# Patient Record
Sex: FEMALE | HISPANIC OR LATINO | Employment: UNEMPLOYED | URBAN - METROPOLITAN AREA
[De-identification: names, ages, dates, MRNs, and addresses within clinical notes are randomized per-mention and may not be internally consistent; named-entity substitution may affect disease eponyms.]

---

## 2019-08-07 ENCOUNTER — HOSPITAL ENCOUNTER (EMERGENCY)
Facility: HOSPITAL | Age: 44
Discharge: HOME | End: 2019-08-08
Attending: EMERGENCY MEDICINE
Payer: MEDICAID

## 2019-08-07 DIAGNOSIS — O02.89 NON-VIABLE PREGNANCY: Primary | ICD-10-CM

## 2019-08-07 DIAGNOSIS — O20.9 BLEEDING IN EARLY PREGNANCY: ICD-10-CM

## 2019-08-07 LAB
ABO + RH BLD: NORMAL
ALBUMIN SERPL BCP-MCNC: 3.9 G/DL (ref 3.5–5.2)
ALP SERPL-CCNC: 64 U/L (ref 55–135)
ALT SERPL W/O P-5'-P-CCNC: 11 U/L (ref 10–44)
ANION GAP SERPL CALC-SCNC: 9 MMOL/L (ref 8–16)
AST SERPL-CCNC: 17 U/L (ref 10–40)
B-HCG UR QL: POSITIVE
BACTERIA #/AREA URNS AUTO: NORMAL /HPF
BASOPHILS # BLD AUTO: 0.04 K/UL (ref 0–0.2)
BASOPHILS NFR BLD: 0.8 % (ref 0–1.9)
BILIRUB SERPL-MCNC: 0.3 MG/DL (ref 0.1–1)
BILIRUB UR QL STRIP: NEGATIVE
BUN SERPL-MCNC: 9 MG/DL (ref 6–20)
CALCIUM SERPL-MCNC: 9.6 MG/DL (ref 8.7–10.5)
CHLORIDE SERPL-SCNC: 107 MMOL/L (ref 95–110)
CLARITY UR REFRACT.AUTO: ABNORMAL
CO2 SERPL-SCNC: 22 MMOL/L (ref 23–29)
COLOR UR AUTO: YELLOW
CREAT SERPL-MCNC: 0.6 MG/DL (ref 0.5–1.4)
CTP QC/QA: YES
DIFFERENTIAL METHOD: ABNORMAL
EOSINOPHIL # BLD AUTO: 0.1 K/UL (ref 0–0.5)
EOSINOPHIL NFR BLD: 1.4 % (ref 0–8)
ERYTHROCYTE [DISTWIDTH] IN BLOOD BY AUTOMATED COUNT: 19.6 % (ref 11.5–14.5)
EST. GFR  (AFRICAN AMERICAN): >60 ML/MIN/1.73 M^2
EST. GFR  (NON AFRICAN AMERICAN): >60 ML/MIN/1.73 M^2
GLUCOSE SERPL-MCNC: 95 MG/DL (ref 70–110)
GLUCOSE UR QL STRIP: NEGATIVE
HCG INTACT+B SERPL-ACNC: NORMAL MIU/ML
HCT VFR BLD AUTO: 32.6 % (ref 37–48.5)
HGB BLD-MCNC: 9.3 G/DL (ref 12–16)
HGB UR QL STRIP: ABNORMAL
IMM GRANULOCYTES # BLD AUTO: 0.01 K/UL (ref 0–0.04)
IMM GRANULOCYTES NFR BLD AUTO: 0.2 % (ref 0–0.5)
KETONES UR QL STRIP: NEGATIVE
LEUKOCYTE ESTERASE UR QL STRIP: NEGATIVE
LYMPHOCYTES # BLD AUTO: 2 K/UL (ref 1–4.8)
LYMPHOCYTES NFR BLD: 39.2 % (ref 18–48)
MCH RBC QN AUTO: 21.1 PG (ref 27–31)
MCHC RBC AUTO-ENTMCNC: 28.5 G/DL (ref 32–36)
MCV RBC AUTO: 74 FL (ref 82–98)
MICROSCOPIC COMMENT: NORMAL
MONOCYTES # BLD AUTO: 0.5 K/UL (ref 0.3–1)
MONOCYTES NFR BLD: 9.6 % (ref 4–15)
NEUTROPHILS # BLD AUTO: 2.5 K/UL (ref 1.8–7.7)
NEUTROPHILS NFR BLD: 48.8 % (ref 38–73)
NITRITE UR QL STRIP: NEGATIVE
NRBC BLD-RTO: 0 /100 WBC
PH UR STRIP: 7 [PH] (ref 5–8)
PLATELET # BLD AUTO: 240 K/UL (ref 150–350)
PMV BLD AUTO: ABNORMAL FL (ref 9.2–12.9)
POTASSIUM SERPL-SCNC: 3.3 MMOL/L (ref 3.5–5.1)
PROT SERPL-MCNC: 8.3 G/DL (ref 6–8.4)
PROT UR QL STRIP: NEGATIVE
RBC # BLD AUTO: 4.41 M/UL (ref 4–5.4)
RBC #/AREA URNS AUTO: 1 /HPF (ref 0–4)
SODIUM SERPL-SCNC: 138 MMOL/L (ref 136–145)
SP GR UR STRIP: 1.01 (ref 1–1.03)
SQUAMOUS #/AREA URNS AUTO: 2 /HPF
URN SPEC COLLECT METH UR: ABNORMAL
WBC # BLD AUTO: 5.08 K/UL (ref 3.9–12.7)
WBC #/AREA URNS AUTO: 3 /HPF (ref 0–5)

## 2019-08-07 PROCEDURE — 99285 EMERGENCY DEPT VISIT HI MDM: CPT | Mod: 25

## 2019-08-07 PROCEDURE — 80053 COMPREHEN METABOLIC PANEL: CPT

## 2019-08-07 PROCEDURE — 86901 BLOOD TYPING SEROLOGIC RH(D): CPT

## 2019-08-07 PROCEDURE — 96360 HYDRATION IV INFUSION INIT: CPT

## 2019-08-07 PROCEDURE — 81025 URINE PREGNANCY TEST: CPT | Performed by: EMERGENCY MEDICINE

## 2019-08-07 PROCEDURE — 99283 PR EMERGENCY DEPT VISIT,LEVEL III: ICD-10-PCS | Mod: ,,, | Performed by: PHYSICIAN ASSISTANT

## 2019-08-07 PROCEDURE — 99283 EMERGENCY DEPT VISIT LOW MDM: CPT | Mod: ,,, | Performed by: PHYSICIAN ASSISTANT

## 2019-08-07 PROCEDURE — 85025 COMPLETE CBC W/AUTO DIFF WBC: CPT

## 2019-08-07 PROCEDURE — 84702 CHORIONIC GONADOTROPIN TEST: CPT

## 2019-08-07 PROCEDURE — 81001 URINALYSIS AUTO W/SCOPE: CPT

## 2019-08-07 PROCEDURE — 63600175 PHARM REV CODE 636 W HCPCS: Performed by: EMERGENCY MEDICINE

## 2019-08-07 RX ORDER — SODIUM CHLORIDE 9 MG/ML
125 INJECTION, SOLUTION INTRAVENOUS CONTINUOUS
Status: DISCONTINUED | OUTPATIENT
Start: 2019-08-07 | End: 2019-08-07

## 2019-08-07 RX ADMIN — SODIUM CHLORIDE 1000 ML: 0.9 INJECTION, SOLUTION INTRAVENOUS at 09:08

## 2019-08-08 VITALS
TEMPERATURE: 99 F | WEIGHT: 131 LBS | OXYGEN SATURATION: 98 % | HEART RATE: 81 BPM | SYSTOLIC BLOOD PRESSURE: 120 MMHG | HEIGHT: 64 IN | DIASTOLIC BLOOD PRESSURE: 62 MMHG | RESPIRATION RATE: 18 BRPM | BODY MASS INDEX: 22.36 KG/M2

## 2019-08-08 NOTE — ED NOTES
Patient encouraged to increase P.O. consumption to fill bladder for ultrasound; pt provided with pitcher of water. Pt verbalized understanding.

## 2019-08-08 NOTE — ED TRIAGE NOTES
Pt reports being 8 weeks pregnant. States the obgyn told her yesterday that she was bleeding; did not tell her why. Pt was instructed not to have sex until US in 2 weeks. Pt reports increased bleeding today with small clots. Pt denies abdominal pain. Reports B flank/ low back pain starting yesterday. Pain 3/10. Denies pain with urination, fevers, chills. .

## 2019-08-08 NOTE — ED PROVIDER NOTES
Encounter Date: 8/7/2019    SCRIBE #1 NOTE: I, Stephanienara Hyde, am scribing for, and in the presence of,  Dr. Cage. I have scribed the entire note.       History     Chief Complaint   Patient presents with    Vaginal Bleeding     Approximately 10 weeks pregnant     44 y.o. Female presenting in the ED with complaints of vaginal bleeding since yesterday. Patient is approximately 10 weeks pregnant. Patient speaks Azeri and is having her daughter translate. Patient saw her OBGYN yesterday who noticed vaginal bleeding during the exam. The patient was told to avoid sex either until the bleeding stopped, or she was able to receive an ultrasound. She denies receiving an ultrasound at the OBGYN. Patient states the bleeding continued today, noting the appearance of small blood clots which scared her. Patient has been nauseous for days due to her pregnancy. Patient endorses diffuse low back pain, denies upper abdominal pain.     The history is provided by a relative and medical records. A  was used.     Review of patient's allergies indicates:  No Known Allergies  No past medical history on file.  No past surgical history on file.  No family history on file.  Social History     Tobacco Use    Smoking status: Not on file   Substance Use Topics    Alcohol use: Not on file    Drug use: Not on file     Review of Systems  General: No fever.  No chills.  Eyes: No visual changes.  Head: No headache.    Integument: No rashes or lesions.  Chest: No shortness of breath.  Cardiovascular: No chest pain.  Abdomen: No abdominal pain.  No nausea or vomiting.  Urinary: Vaginal bleeding. No abnormal urination.  Neurologic: No focal weakness.  No numbness.  Hematologic: No easy bruising.  Endocrine: No excessive thirst or urination.  Musculoskeletal: Low back pain.    Physical Exam     Initial Vitals [08/07/19 1951]   BP Pulse Resp Temp SpO2   (!) 127/58 81 16 98.6 °F (37 °C) 100 %      MAP       --         Physical  Exam    Nursing note and vitals reviewed.    Appearance: No acute distress.  Skin: No rashes seen.  Good turgor.  No abrasions.  No ecchymoses.  Eyes: No conjunctival injection.  ENT: Oropharynx clear.    Chest: Clear to auscultation bilaterally.  Good air movement.  No wheezes.  No rhonchi.  Cardiovascular: Regular rate and rhythm.  No murmurs. No gallops. No rubs.  Abdomen: Soft.  Not distended.  Nontender.  No guarding.  No rebound.  Musculoskeletal: Good range of motion all joints.  No deformities.  Neck supple.  No meningismus.  Neurologic: Motor intact.  Sensation intact.  Cerebellar intact.  Cranial nerves intact.  Mental Status:  Alert and oriented x 3.  Appropriate, conversant.    ED Course   Procedures  Labs Reviewed   CBC W/ AUTO DIFFERENTIAL - Abnormal; Notable for the following components:       Result Value    Hemoglobin 9.3 (*)     Hematocrit 32.6 (*)     Mean Corpuscular Volume 74 (*)     Mean Corpuscular Hemoglobin 21.1 (*)     Mean Corpuscular Hemoglobin Conc 28.5 (*)     RDW 19.6 (*)     All other components within normal limits   COMPREHENSIVE METABOLIC PANEL - Abnormal; Notable for the following components:    Potassium 3.3 (*)     CO2 22 (*)     All other components within normal limits   URINALYSIS, REFLEX TO URINE CULTURE - Abnormal; Notable for the following components:    Appearance, UA Hazy (*)     Occult Blood UA 3+ (*)     All other components within normal limits    Narrative:     Preferred Collection Type->Urine, Clean Catch   POCT URINE PREGNANCY - Abnormal; Notable for the following components:    POC Preg Test, Ur Positive (*)     All other components within normal limits   HCG, QUANTITATIVE, PREGNANCY   URINALYSIS MICROSCOPIC    Narrative:     Preferred Collection Type->Urine, Clean Catch   GROUP & RH          Imaging Results           US OB Less Than 14 Wks with Transvag(xpd (Final result)  Result time 08/08/19 00:27:15    Final result by Curtis Soler MD (08/08/19 00:27:15)                  Impression:      Single intrauterine pregnancy with crown-rump length of 1.1 cm, but no detectable cardiac activity.  Sonographic findings are diagnostic of pregnancy failure, though correlation with clinical findings and follow-up is advised.    This report was flagged in Epic as abnormal.    Electronically signed by resident: Kenneth Jean MD  Date:    08/07/2019  Time:    23:50    Electronically signed by: Curtis Soler MD  Date:    08/08/2019  Time:    00:27             Narrative:    EXAMINATION:  US OB LESS THAN 14 WKS WITH TRANSVAGINAL (XPD)    CLINICAL HISTORY:  Vag Bleeding;    TECHNIQUE:  Transabdominal sonography of the pelvis was performed, followed by transvaginal sonography to better evaluate the uterus and ovaries.    COMPARISON:  None.    FINDINGS:  Uterus is normal in size and contains a single gestational sac with mean sac diameter of 2.98 cm.    The sac contains a single fetal pole which measures 1.1 cm in crown-rump length.  Measurements are most consistent with a gestational age of 7 weeks 6 days.    Yolk sac and cardiac activity are not detected.    Right ovary: Normal in size measuring 2.6 x 1.8 x 1.6.    Left ovary: Normal in size measuring 2.5 x 2.0 x 2.6 with a 1.8 x 1.4 x 2.0 cm corpus luteum and prominent adnexal vasculature.    Miscellaneous: Trace free fluid in the cul-de-sac.                                 Medical Decision Making:   History:   Old Medical Records: I decided to obtain old medical records.  Initial Assessment:   Vaginal bleeding and lower abdominal pain. Fairly benign exam. Will do ultrasound to rule out ectopic.  Will check labs, urine.    U/A shows no UTI.  Quant 10k.  Pending ultrasound at shift change.     Clinical Tests:   Lab Tests: Ordered and Reviewed  Radiological Study: Ordered and Reviewed       APC / Resident Notes:   I assumed care of the patient due to shift change and pending fetal US. I discussed the case in detail with the ER  attending physician.      The patient is Maltese speaking, but accompanied by her daughter who is able to fluently translate. Edgar and translation service refused. The patient reports that she is a  SAB2 who estimates that she is currently 8-10 weeks pregnant. She states that she has Rh positive blood. She came to the ER due to acute vaginal bleeding. She denies passing any tissue or clot. She denies any abdominal or pelvic pain. She denies any weakness or lightheadedness. Her vital signs are stable. Her Fetal US revealed a non-viable IUP. I did a pelvic exam with a female ER tech present as a chaperone. Her cervical OS is closed and not dilated. There is a scant amount of blood present. No tissue or clot. No active bleeding or hemorrhage appreciated. No abdominal/pelvic pain appreciated. No evidence of infection.     Exam and results communicated with ER attending physician     The patient has been through 2 previous miscarriages in the past with expectant management, denies any previous D & C. Reports that she already has an appointment with her OB tomorrow at [facility].     I discussed her results and diagnosis with her at length. Pt comfortable going home. Pt agrees to return to the ER promptly if worse in any way, increased pain/bleeding, etc.        Scribe Attestation:   Scribe #1: I performed the above scribed service and the documentation accurately describes the services I performed. I attest to the accuracy of the note.               Clinical Impression:       ICD-10-CM ICD-9-CM   1. Non-viable pregnancy O02.89 631.8   2. Bleeding in early pregnancy O20.9 640.90         Disposition:   Disposition: Discharged  Condition: Stable                        Wolfgang Walls PA-C  [phone]       Jass Cage MD  [phone]

## 2019-08-09 ENCOUNTER — HOSPITAL ENCOUNTER (OUTPATIENT)
Facility: OTHER | Age: 44
Discharge: HOME | End: 2019-08-10
Attending: EMERGENCY MEDICINE | Admitting: OBSTETRICS & GYNECOLOGY
Payer: MEDICAID

## 2019-08-09 DIAGNOSIS — O03.9 MISCARRIAGE: Primary | ICD-10-CM

## 2019-08-09 DIAGNOSIS — R10.9 ABDOMINAL PAIN, UNSPECIFIED ABDOMINAL LOCATION: ICD-10-CM

## 2019-08-09 DIAGNOSIS — N93.9 VAGINAL BLEEDING: ICD-10-CM

## 2019-08-09 DIAGNOSIS — D64.9 SYMPTOMATIC ANEMIA: ICD-10-CM

## 2019-08-09 LAB
ABO + RH BLD: NORMAL
ABO + RH BLD: NORMAL
ANION GAP SERPL CALC-SCNC: 10 MMOL/L (ref 8–16)
ANISOCYTOSIS BLD QL SMEAR: SLIGHT
BASOPHILS # BLD AUTO: 0.02 K/UL (ref 0–0.2)
BASOPHILS # BLD AUTO: 0.03 K/UL (ref 0–0.2)
BASOPHILS # BLD AUTO: 0.04 K/UL (ref 0–0.2)
BASOPHILS NFR BLD: 0.4 % (ref 0–1.9)
BASOPHILS NFR BLD: 0.5 % (ref 0–1.9)
BASOPHILS NFR BLD: 0.7 % (ref 0–1.9)
BLD GP AB SCN CELLS X3 SERPL QL: NORMAL
BLD GP AB SCN CELLS X3 SERPL QL: NORMAL
BUN SERPL-MCNC: 10 MG/DL (ref 6–20)
BUN SERPL-MCNC: 11 MG/DL (ref 6–30)
CALCIUM SERPL-MCNC: 9.8 MG/DL (ref 8.7–10.5)
CHLORIDE SERPL-SCNC: 107 MMOL/L (ref 95–110)
CHLORIDE SERPL-SCNC: 107 MMOL/L (ref 95–110)
CO2 SERPL-SCNC: 20 MMOL/L (ref 23–29)
CREAT SERPL-MCNC: 0.3 MG/DL (ref 0.5–1.4)
CREAT SERPL-MCNC: 0.6 MG/DL (ref 0.5–1.4)
DIFFERENTIAL METHOD: ABNORMAL
EOSINOPHIL # BLD AUTO: 0 K/UL (ref 0–0.5)
EOSINOPHIL # BLD AUTO: 0 K/UL (ref 0–0.5)
EOSINOPHIL # BLD AUTO: 0.1 K/UL (ref 0–0.5)
EOSINOPHIL NFR BLD: 0.2 % (ref 0–8)
EOSINOPHIL NFR BLD: 0.5 % (ref 0–8)
EOSINOPHIL NFR BLD: 1 % (ref 0–8)
ERYTHROCYTE [DISTWIDTH] IN BLOOD BY AUTOMATED COUNT: 19 % (ref 11.5–14.5)
ERYTHROCYTE [DISTWIDTH] IN BLOOD BY AUTOMATED COUNT: 19.3 % (ref 11.5–14.5)
ERYTHROCYTE [DISTWIDTH] IN BLOOD BY AUTOMATED COUNT: 19.5 % (ref 11.5–14.5)
EST. GFR  (AFRICAN AMERICAN): >60 ML/MIN/1.73 M^2
EST. GFR  (NON AFRICAN AMERICAN): >60 ML/MIN/1.73 M^2
GLUCOSE SERPL-MCNC: 92 MG/DL (ref 70–110)
GLUCOSE SERPL-MCNC: 95 MG/DL (ref 70–110)
HCG INTACT+B SERPL-ACNC: 7526 MIU/ML
HCT VFR BLD AUTO: 25.6 % (ref 37–48.5)
HCT VFR BLD AUTO: 29.2 % (ref 37–48.5)
HCT VFR BLD AUTO: 33.2 % (ref 37–48.5)
HCT VFR BLD CALC: 27 %PCV (ref 36–54)
HGB BLD-MCNC: 7.6 G/DL (ref 12–16)
HGB BLD-MCNC: 8.1 G/DL (ref 12–16)
HGB BLD-MCNC: 9.8 G/DL (ref 12–16)
HYPOCHROMIA BLD QL SMEAR: ABNORMAL
IMM GRANULOCYTES # BLD AUTO: 0.01 K/UL (ref 0–0.04)
IMM GRANULOCYTES # BLD AUTO: 0.02 K/UL (ref 0–0.04)
IMM GRANULOCYTES # BLD AUTO: 0.03 K/UL (ref 0–0.04)
IMM GRANULOCYTES NFR BLD AUTO: 0.2 % (ref 0–0.5)
IMM GRANULOCYTES NFR BLD AUTO: 0.3 % (ref 0–0.5)
IMM GRANULOCYTES NFR BLD AUTO: 0.5 % (ref 0–0.5)
LYMPHOCYTES # BLD AUTO: 1.3 K/UL (ref 1–4.8)
LYMPHOCYTES # BLD AUTO: 1.5 K/UL (ref 1–4.8)
LYMPHOCYTES # BLD AUTO: 1.7 K/UL (ref 1–4.8)
LYMPHOCYTES NFR BLD: 21.4 % (ref 18–48)
LYMPHOCYTES NFR BLD: 27.5 % (ref 18–48)
LYMPHOCYTES NFR BLD: 29.5 % (ref 18–48)
MCH RBC QN AUTO: 20.9 PG (ref 27–31)
MCH RBC QN AUTO: 21.1 PG (ref 27–31)
MCH RBC QN AUTO: 21.1 PG (ref 27–31)
MCHC RBC AUTO-ENTMCNC: 27.7 G/DL (ref 32–36)
MCHC RBC AUTO-ENTMCNC: 29.5 G/DL (ref 32–36)
MCHC RBC AUTO-ENTMCNC: 29.7 G/DL (ref 32–36)
MCV RBC AUTO: 71 FL (ref 82–98)
MCV RBC AUTO: 72 FL (ref 82–98)
MCV RBC AUTO: 75 FL (ref 82–98)
MONOCYTES # BLD AUTO: 0.3 K/UL (ref 0.3–1)
MONOCYTES # BLD AUTO: 0.3 K/UL (ref 0.3–1)
MONOCYTES # BLD AUTO: 0.4 K/UL (ref 0.3–1)
MONOCYTES NFR BLD: 5 % (ref 4–15)
MONOCYTES NFR BLD: 5.4 % (ref 4–15)
MONOCYTES NFR BLD: 6.1 % (ref 4–15)
NEUTROPHILS # BLD AUTO: 3.6 K/UL (ref 1.8–7.7)
NEUTROPHILS # BLD AUTO: 3.7 K/UL (ref 1.8–7.7)
NEUTROPHILS # BLD AUTO: 4.4 K/UL (ref 1.8–7.7)
NEUTROPHILS NFR BLD: 63.1 % (ref 38–73)
NEUTROPHILS NFR BLD: 66.4 % (ref 38–73)
NEUTROPHILS NFR BLD: 71.3 % (ref 38–73)
NRBC BLD-RTO: 0 /100 WBC
PLATELET # BLD AUTO: 210 K/UL (ref 150–350)
PLATELET # BLD AUTO: 230 K/UL (ref 150–350)
PLATELET # BLD AUTO: 246 K/UL (ref 150–350)
PLATELET BLD QL SMEAR: ABNORMAL
PMV BLD AUTO: 12.5 FL (ref 9.2–12.9)
PMV BLD AUTO: ABNORMAL FL (ref 9.2–12.9)
PMV BLD AUTO: ABNORMAL FL (ref 9.2–12.9)
POC IONIZED CALCIUM: 1.15 MMOL/L (ref 1.06–1.42)
POC TCO2 (MEASURED): 21 MMOL/L (ref 23–29)
POCT GLUCOSE: 88 MG/DL (ref 70–110)
POTASSIUM BLD-SCNC: 3 MMOL/L (ref 3.5–5.1)
POTASSIUM SERPL-SCNC: 3.6 MMOL/L (ref 3.5–5.1)
RBC # BLD AUTO: 3.61 M/UL (ref 4–5.4)
RBC # BLD AUTO: 3.88 M/UL (ref 4–5.4)
RBC # BLD AUTO: 4.64 M/UL (ref 4–5.4)
SAMPLE: ABNORMAL
SODIUM BLD-SCNC: 141 MMOL/L (ref 136–145)
SODIUM SERPL-SCNC: 137 MMOL/L (ref 136–145)
WBC # BLD AUTO: 5.6 K/UL (ref 3.9–12.7)
WBC # BLD AUTO: 5.72 K/UL (ref 3.9–12.7)
WBC # BLD AUTO: 6.11 K/UL (ref 3.9–12.7)

## 2019-08-09 PROCEDURE — 99203 PR OFFICE/OUTPT VISIT, NEW, LEVL III, 30-44 MIN: ICD-10-PCS | Mod: ,,, | Performed by: OBSTETRICS & GYNECOLOGY

## 2019-08-09 PROCEDURE — G0378 HOSPITAL OBSERVATION PER HR: HCPCS

## 2019-08-09 PROCEDURE — 84702 CHORIONIC GONADOTROPIN TEST: CPT

## 2019-08-09 PROCEDURE — 63600175 PHARM REV CODE 636 W HCPCS: Performed by: EMERGENCY MEDICINE

## 2019-08-09 PROCEDURE — 25000003 PHARM REV CODE 250: Performed by: STUDENT IN AN ORGANIZED HEALTH CARE EDUCATION/TRAINING PROGRAM

## 2019-08-09 PROCEDURE — 86850 RBC ANTIBODY SCREEN: CPT

## 2019-08-09 PROCEDURE — 36415 COLL VENOUS BLD VENIPUNCTURE: CPT

## 2019-08-09 PROCEDURE — 96375 TX/PRO/DX INJ NEW DRUG ADDON: CPT

## 2019-08-09 PROCEDURE — 99285 PR EMERGENCY DEPT VISIT,LEVEL V: ICD-10-PCS | Mod: ,,, | Performed by: EMERGENCY MEDICINE

## 2019-08-09 PROCEDURE — 85025 COMPLETE CBC W/AUTO DIFF WBC: CPT | Mod: 91

## 2019-08-09 PROCEDURE — 99291 CRITICAL CARE FIRST HOUR: CPT | Mod: 25

## 2019-08-09 PROCEDURE — 88305 TISSUE EXAM BY PATHOLOGIST: CPT | Mod: 26,,, | Performed by: PATHOLOGY

## 2019-08-09 PROCEDURE — 88305 TISSUE SPECIMEN TO PATHOLOGY - SURGERY: ICD-10-PCS | Mod: 26,,, | Performed by: PATHOLOGY

## 2019-08-09 PROCEDURE — 36430 TRANSFUSION BLD/BLD COMPNT: CPT

## 2019-08-09 PROCEDURE — 80048 BASIC METABOLIC PNL TOTAL CA: CPT

## 2019-08-09 PROCEDURE — 25000003 PHARM REV CODE 250: Performed by: EMERGENCY MEDICINE

## 2019-08-09 PROCEDURE — 86920 COMPATIBILITY TEST SPIN: CPT

## 2019-08-09 PROCEDURE — 86901 BLOOD TYPING SEROLOGIC RH(D): CPT | Mod: 91

## 2019-08-09 PROCEDURE — 80047 BASIC METABLC PNL IONIZED CA: CPT

## 2019-08-09 PROCEDURE — 99203 OFFICE O/P NEW LOW 30 MIN: CPT | Mod: ,,, | Performed by: OBSTETRICS & GYNECOLOGY

## 2019-08-09 PROCEDURE — 96374 THER/PROPH/DIAG INJ IV PUSH: CPT

## 2019-08-09 PROCEDURE — 99285 EMERGENCY DEPT VISIT HI MDM: CPT | Mod: ,,, | Performed by: EMERGENCY MEDICINE

## 2019-08-09 PROCEDURE — P9021 RED BLOOD CELLS UNIT: HCPCS

## 2019-08-09 PROCEDURE — 88305 TISSUE EXAM BY PATHOLOGIST: CPT | Performed by: PATHOLOGY

## 2019-08-09 RX ORDER — MISOPROSTOL 200 UG/1
600 TABLET ORAL
Status: COMPLETED | OUTPATIENT
Start: 2019-08-09 | End: 2019-08-09

## 2019-08-09 RX ORDER — KETOROLAC TROMETHAMINE 30 MG/ML
10 INJECTION, SOLUTION INTRAMUSCULAR; INTRAVENOUS
Status: COMPLETED | OUTPATIENT
Start: 2019-08-09 | End: 2019-08-09

## 2019-08-09 RX ORDER — ACETAMINOPHEN 500 MG
1000 TABLET ORAL
Status: COMPLETED | OUTPATIENT
Start: 2019-08-09 | End: 2019-08-09

## 2019-08-09 RX ORDER — IBUPROFEN 600 MG/1
600 TABLET ORAL EVERY 6 HOURS PRN
Qty: 30 TABLET | Refills: 0 | Status: SHIPPED | OUTPATIENT
Start: 2019-08-09

## 2019-08-09 RX ORDER — HYDROCODONE BITARTRATE AND ACETAMINOPHEN 5; 325 MG/1; MG/1
1 TABLET ORAL EVERY 6 HOURS PRN
Qty: 5 TABLET | Refills: 0 | Status: SHIPPED | OUTPATIENT
Start: 2019-08-09

## 2019-08-09 RX ORDER — DOXYCYCLINE 100 MG/1
100 CAPSULE ORAL EVERY 12 HOURS
Qty: 14 CAPSULE | Refills: 0 | Status: SHIPPED | OUTPATIENT
Start: 2019-08-09 | End: 2019-08-16

## 2019-08-09 RX ORDER — HYDROCODONE BITARTRATE AND ACETAMINOPHEN 500; 5 MG/1; MG/1
TABLET ORAL
Status: DISCONTINUED | OUTPATIENT
Start: 2019-08-09 | End: 2019-08-10 | Stop reason: HOSPADM

## 2019-08-09 RX ORDER — SODIUM CHLORIDE 0.9 % (FLUSH) 0.9 %
10 SYRINGE (ML) INJECTION
Status: DISCONTINUED | OUTPATIENT
Start: 2019-08-09 | End: 2019-08-10 | Stop reason: HOSPADM

## 2019-08-09 RX ORDER — DOXYCYCLINE HYCLATE 100 MG
200 TABLET ORAL
Status: COMPLETED | OUTPATIENT
Start: 2019-08-09 | End: 2019-08-09

## 2019-08-09 RX ORDER — MORPHINE SULFATE 4 MG/ML
4 INJECTION, SOLUTION INTRAMUSCULAR; INTRAVENOUS
Status: COMPLETED | OUTPATIENT
Start: 2019-08-09 | End: 2019-08-09

## 2019-08-09 RX ORDER — ONDANSETRON 2 MG/ML
4 INJECTION INTRAMUSCULAR; INTRAVENOUS
Status: COMPLETED | OUTPATIENT
Start: 2019-08-09 | End: 2019-08-09

## 2019-08-09 RX ADMIN — MISOPROSTOL 600 MCG: 200 TABLET ORAL at 01:08

## 2019-08-09 RX ADMIN — ONDANSETRON 4 MG: 2 INJECTION INTRAMUSCULAR; INTRAVENOUS at 09:08

## 2019-08-09 RX ADMIN — ACETAMINOPHEN 1000 MG: 500 TABLET ORAL at 08:08

## 2019-08-09 RX ADMIN — ACETAMINOPHEN 1000 MG: 500 TABLET ORAL at 06:08

## 2019-08-09 RX ADMIN — MORPHINE SULFATE 4 MG: 4 INJECTION INTRAVENOUS at 08:08

## 2019-08-09 RX ADMIN — KETOROLAC TROMETHAMINE 10 MG: 30 INJECTION, SOLUTION INTRAMUSCULAR at 11:08

## 2019-08-09 RX ADMIN — DOXYCYCLINE HYCLATE 200 MG: 100 TABLET, COATED ORAL at 06:08

## 2019-08-09 RX ADMIN — MORPHINE SULFATE 4 MG: 4 INJECTION INTRAVENOUS at 02:08

## 2019-08-09 RX ADMIN — KETOROLAC TROMETHAMINE 10 MG: 30 INJECTION, SOLUTION INTRAMUSCULAR at 09:08

## 2019-08-09 NOTE — ED NOTES
Cardiac monitoring confirmed on ER tele screen -  HR 68,  NSR, family at bedside. Pt resting in recliner , VS's stable.

## 2019-08-09 NOTE — ED NOTES
Pt given water with instruction to drink slowly and she needs it for a US and not to use the restroom. Pt family states they understood instructions.

## 2019-08-09 NOTE — ED NOTES
Physician aware of I STAT HCT 27, states ok to go to recliner on cardiac monitoring, update regarding vitals and current bleeding, update to CN.

## 2019-08-09 NOTE — ED TRIAGE NOTES
Kenna used for interpretation. Pt reports post miscarriage with increasing abdominal pain. Pt states seeing OBGYN yesterday and being examined but if she said to come back to ER if she continues to have pain and bleeding. Pt reports starting to bleed again today at 2AM. Pt states abdominal pain is from the waist down.

## 2019-08-09 NOTE — CONSULTS
Consult Note  Gynecology      SUBJECTIVE:     History of Present Illness:  Patient is a 44 y.o.  who presents as a transfer from the [facility] ED where she had vaginal bleeding during a miscarriage. She was diagnosed with a missed  on . She reported having increased bleeding this morning and presented to the ED. Repeat US showed IUP had passed, but question of further products in the uterus. She received one dose of PO cytotec and was observed in the ED where they became concerned about her bleeding.     Presently, patient complains of abdominal cramping and bleeding. She does have some dizziness. She denies fever, chills, nausea, vomiting.     History provided by daughter at bedside.     PMHx: No past medical history on file.    PSHx: No past surgical history on file.    All: Review of patient's allergies indicates:  No Known Allergies    Meds:   (Not in a hospital admission)    SH:   Social History     Socioeconomic History    Marital status: Single     Spouse name: Not on file    Number of children: Not on file    Years of education: Not on file    Highest education level: Not on file   Occupational History    Not on file   Social Needs    Financial resource strain: Not on file    Food insecurity:     Worry: Not on file     Inability: Not on file    Transportation needs:     Medical: Not on file     Non-medical: Not on file   Tobacco Use    Smoking status: Not on file   Substance and Sexual Activity    Alcohol use: Not on file    Drug use: Not on file    Sexual activity: Not on file   Lifestyle    Physical activity:     Days per week: Not on file     Minutes per session: Not on file    Stress: Not on file   Relationships    Social connections:     Talks on phone: Not on file     Gets together: Not on file     Attends Confucianism service: Not on file     Active member of club or organization: Not on file     Attends meetings of clubs or organizations: Not on file     Relationship status:  Not on file   Other Topics Concern    Not on file   Social History Narrative    Not on file       FH: No family history on file.    Review of Systems:  Constitutional: no fever or chills  Respiratory: no cough or shortness of breath  Cardiovascular: no chest pain or palpitations  Gastrointestinal: no nausea or vomiting, tolerating diet, negative for change in bowel habits  Genitourinary: +vaginal bleeding  Integument/Breast: no rash or pruritis, negative for breast lump, nipple discharge and skin lesion(s)  Hematologic/Lymphatic: no easy bruising or lymphadenopathy     OBJECTIVE:     Temp:  [98.1 °F (36.7 °C)-98.9 °F (37.2 °C)] 98.4 °F (36.9 °C)  Pulse:  [] 96  Resp:  [16-24] 19  SpO2:  [98 %-100 %] 100 %  BP: ()/(57-87) 128/75    Recent Results (from the past 24 hour(s))   CBC auto differential    Collection Time: 08/09/19  6:39 AM   Result Value Ref Range    WBC 5.72 3.90 - 12.70 K/uL    RBC 4.64 4.00 - 5.40 M/uL    Hemoglobin 9.8 (L) 12.0 - 16.0 g/dL    Hematocrit 33.2 (L) 37.0 - 48.5 %    Mean Corpuscular Volume 72 (L) 82 - 98 fL    Mean Corpuscular Hemoglobin 21.1 (L) 27.0 - 31.0 pg    Mean Corpuscular Hemoglobin Conc 29.5 (L) 32.0 - 36.0 g/dL    RDW 19.5 (H) 11.5 - 14.5 %    Platelets 246 150 - 350 K/uL    MPV 12.5 9.2 - 12.9 fL    Immature Granulocytes 0.3 0.0 - 0.5 %    Gran # (ANC) 3.6 1.8 - 7.7 K/uL    Immature Grans (Abs) 0.02 0.00 - 0.04 K/uL    Lymph # 1.7 1.0 - 4.8 K/uL    Mono # 0.3 0.3 - 1.0 K/uL    Eos # 0.1 0.0 - 0.5 K/uL    Baso # 0.04 0.00 - 0.20 K/uL    nRBC 0 0 /100 WBC    Gran% 63.1 38.0 - 73.0 %    Lymph% 29.5 18.0 - 48.0 %    Mono% 5.4 4.0 - 15.0 %    Eosinophil% 1.0 0.0 - 8.0 %    Basophil% 0.7 0.0 - 1.9 %    Differential Method Automated    Basic metabolic panel    Collection Time: 08/09/19  6:39 AM   Result Value Ref Range    Sodium 137 136 - 145 mmol/L    Potassium 3.6 3.5 - 5.1 mmol/L    Chloride 107 95 - 110 mmol/L    CO2 20 (L) 23 - 29 mmol/L    Glucose 92 70 - 110 mg/dL     BUN, Bld 10 6 - 20 mg/dL    Creatinine 0.6 0.5 - 1.4 mg/dL    Calcium 9.8 8.7 - 10.5 mg/dL    Anion Gap 10 8 - 16 mmol/L    eGFR if African American >60.0 >60 mL/min/1.73 m^2    eGFR if non African American >60.0 >60 mL/min/1.73 m^2   hCG, quantitative, pregnancy    Collection Time: 08/09/19  6:39 AM   Result Value Ref Range    hCG Quant 7526 See Text mIU/mL   ISTAT PROCEDURE    Collection Time: 08/09/19 11:48 AM   Result Value Ref Range    POC Glucose 95 70 - 110 mg/dL    POC BUN 11 6 - 30 mg/dL    POC Creatinine 0.3 (L) 0.5 - 1.4 mg/dL    POC Sodium 141 136 - 145 mmol/L    POC Potassium 3.0 (L) 3.5 - 5.1 mmol/L    POC Chloride 107 95 - 110 mmol/L    POC TCO2 (MEASURED) 21 (L) 23 - 29 mmol/L    POC Ionized Calcium 1.15 1.06 - 1.42 mmol/L    POC Hematocrit 27 (L) 36 - 54 %PCV    Sample JHOAN    CBC auto differential    Collection Time: 08/09/19  1:18 PM   Result Value Ref Range    WBC 5.60 3.90 - 12.70 K/uL    RBC 3.88 (L) 4.00 - 5.40 M/uL    Hemoglobin 8.1 (L) 12.0 - 16.0 g/dL    Hematocrit 29.2 (L) 37.0 - 48.5 %    Mean Corpuscular Volume 75 (L) 82 - 98 fL    Mean Corpuscular Hemoglobin 20.9 (L) 27.0 - 31.0 pg    Mean Corpuscular Hemoglobin Conc 27.7 (L) 32.0 - 36.0 g/dL    RDW 19.3 (H) 11.5 - 14.5 %    Platelets 210 150 - 350 K/uL    MPV SEE COMMENT 9.2 - 12.9 fL    Immature Granulocytes 0.2 0.0 - 0.5 %    Gran # (ANC) 3.7 1.8 - 7.7 K/uL    Immature Grans (Abs) 0.01 0.00 - 0.04 K/uL    Lymph # 1.5 1.0 - 4.8 K/uL    Mono # 0.3 0.3 - 1.0 K/uL    Eos # 0.0 0.0 - 0.5 K/uL    Baso # 0.02 0.00 - 0.20 K/uL    nRBC 0 0 /100 WBC    Gran% 66.4 38.0 - 73.0 %    Lymph% 27.5 18.0 - 48.0 %    Mono% 5.0 4.0 - 15.0 %    Eosinophil% 0.5 0.0 - 8.0 %    Basophil% 0.4 0.0 - 1.9 %    Differential Method Automated    Type & Screen    Collection Time: 08/09/19  1:22 PM   Result Value Ref Range    Group & Rh A POS     Indirect Markos NEG          Physical Exam:  GEN: No apparent distress. Alert and oriented.   CV: Regular rate and  rhythm. Normal sounding S1 and S2. No murmur, rub, or gallop noted.  ABDOMEN: Soft, non tender, non-distended. No guarding or rebound tenderness.  EXT: No erythema, no edema  EXT. GENITALIA: appear normal, no lesions noted  VAGINA: Large amount of clots in vagina, cleared. No active bleeding.   CERVIX: Remaining products of conception protruding from os, removed with ring forceps. No bleeding after removal.   UTERUS: 10 wks size, normal contour, mobile, no fundal tenderness  ADNEXA: No palpable masses, no adnexal tenderness        ASSESSMENT/PLAN:     Assessment:    45yo  who presents as a transfer for spontaneous . Products of conception removed on exam.     Plan:  - Vaginal bleeding stable on exam. No bleeding after removal of products  - Last H/h 8.1/29.2   - Will give 200 mg doxycycline now  - Patient complains of dizziness with standing. BP slightly lower than normal in the ED. Repeat H/h 7.6/25.6  - Admit to observation for transfusion of one unit PRBC. Anticipate discharge after infusion is complete.        Jerrod Douglass MD  PGY-4 OB/GYN  [phone]    Discussed plan with staff who was in agreement.

## 2019-08-09 NOTE — PROVIDER PROGRESS NOTES - EMERGENCY DEPT.
Encounter Date: 8/9/2019    ED Physician Progress Notes        Physician Note:   Hematocrit change noted.  Copy amounts of clots on vaginal exam.  Patient continues to be hemodynamically stable.  Will discuss with gyn.  Given ED for continued monitoring at this time.  Given Toradol..

## 2019-08-09 NOTE — ED NOTES
Lionel SHIELDS aware of pt lab changes, no orders given, and states pt can stay in [facility] for time being.

## 2019-08-09 NOTE — ED PROVIDER NOTES
Encounter Date: 2019    SCRIBE #1 NOTE: I, Fatuma Pulido, am scribing for, and in the presence of, Dr. Soto.       History     Chief Complaint   Patient presents with    Abdominal Pain     dx with miscarriage , still c/o abdominal pain and small amount of vaginal bleeding.      Time seen by provider: 3:01 PM    This is a 44 y.o. female (A3) transferred from [facility] on [facility] low blood count secondary to spontaneous miscarriage at 10 weeks gestation PTA. Per daughter, pt was seen at [facility] four days ago for vaginal bleeding, which has progressively worsened, becoming heavier today. Daughter states pt had US done today which showed no sign of pregnancy in uterus.  Pt reports associated nausea and dizziness. She denies HA and fever. She has no hx of HTN and DM. She has no hx of alcohol or tobacco use.     The history is provided by the patient and a relative.     Review of patient's allergies indicates:  No Known Allergies  No past medical history on file.  No past surgical history on file.  No family history on file.  Social History     Tobacco Use    Smoking status: Not on file   Substance Use Topics    Alcohol use: Not on file    Drug use: Not on file     Review of Systems   Constitutional: Negative for chills and fever.   HENT: Negative for congestion, rhinorrhea and sore throat.    Eyes: Negative for visual disturbance.   Respiratory: Negative for cough and shortness of breath.    Cardiovascular: Negative for chest pain.   Gastrointestinal: Positive for nausea. Negative for abdominal pain, diarrhea and vomiting.   Genitourinary: Positive for vaginal bleeding. Negative for dysuria.   Musculoskeletal: Negative for back pain.   Skin: Negative for rash.   Neurological: Positive for dizziness. Negative for weakness, light-headedness and headaches.   Psychiatric/Behavioral: Negative for confusion.       Physical Exam     Initial Vitals [19 0543]   BP Pulse Resp Temp SpO2    (!) 117/59 74 18 98.3 °F (36.8 °C) 100 %      MAP       --         Physical Exam    Nursing note and vitals reviewed.  Constitutional: She appears well-developed and well-nourished. She is not diaphoretic. No distress.   Drowsy but has received morphine. No distress.   HENT:   Head: Normocephalic and atraumatic.   Right Ear: External ear normal.   Left Ear: External ear normal.   Eyes: Conjunctivae and EOM are normal. Pupils are equal, round, and reactive to light.   No pallor or icterus.     Neck: Normal range of motion. Neck supple.   Cardiovascular: Normal rate, regular rhythm and normal heart sounds. Exam reveals no gallop and no friction rub.    No murmur heard.  Pulmonary/Chest: Breath sounds normal. No respiratory distress. She has no wheezes. She has no rhonchi. She has no rales.   Abdominal: Soft. Bowel sounds are normal.   Minimal suprapubic tenderness. No rebound or guarding. Uterus not palpable above the pelvic brim.    Musculoskeletal: Normal range of motion. She exhibits no edema or tenderness.   Neurological: She is alert and oriented to person, place, and time.   Skin: Skin is warm and dry.   Psychiatric: She has a normal mood and affect. Her behavior is normal. Judgment and thought content normal.         ED Course   Critical Care  Date/Time: 8/9/2019 9:31 PM  Performed by: Jermaine Soto II, MD  Authorized by: Jermaine Soto II, MD   Direct patient critical care time: 20 minutes  Additional history critical care time: 5 minutes  Ordering / reviewing critical care time: 5 minutes  Documentation critical care time: 10 minutes  Consulting other physicians critical care time: 10 minutes  Consult with family critical care time: 5 minutes  Total critical care time (exclusive of procedural time) : 55 minutes  Critical care was necessary to treat or prevent imminent or life-threatening deterioration of the following conditions: circulatory failure and shock.        Labs Reviewed   CBC W/ AUTO  DIFFERENTIAL - Abnormal; Notable for the following components:       Result Value    Hemoglobin 9.8 (*)     Hematocrit 33.2 (*)     Mean Corpuscular Volume 72 (*)     Mean Corpuscular Hemoglobin 21.1 (*)     Mean Corpuscular Hemoglobin Conc 29.5 (*)     RDW 19.5 (*)     All other components within normal limits   BASIC METABOLIC PANEL - Abnormal; Notable for the following components:    CO2 20 (*)     All other components within normal limits   CBC W/ AUTO DIFFERENTIAL - Abnormal; Notable for the following components:    RBC 3.88 (*)     Hemoglobin 8.1 (*)     Hematocrit 29.2 (*)     Mean Corpuscular Volume 75 (*)     Mean Corpuscular Hemoglobin 20.9 (*)     Mean Corpuscular Hemoglobin Conc 27.7 (*)     RDW 19.3 (*)     All other components within normal limits   CBC W/ AUTO DIFFERENTIAL - Abnormal; Notable for the following components:    RBC 3.61 (*)     Hemoglobin 7.6 (*)     Hematocrit 25.6 (*)     Mean Corpuscular Volume 71 (*)     Mean Corpuscular Hemoglobin 21.1 (*)     Mean Corpuscular Hemoglobin Conc 29.7 (*)     RDW 19.0 (*)     All other components within normal limits   ISTAT PROCEDURE - Abnormal; Notable for the following components:    POC Creatinine 0.3 (*)     POC Potassium 3.0 (*)     POC TCO2 (MEASURED) 21 (*)     POC Hematocrit 27 (*)     All other components within normal limits   HCG, QUANTITATIVE, PREGNANCY   TYPE & SCREEN   TYPE & SCREEN   TISSUE SPECIMEN TO PATHOLOGY - SURGERY   POCT GLUCOSE   ISTAT CHEM8   PREPARE RBC SOFT          Imaging Results           US OB Less Than 14 Wks First Gestation (Final result)  Result time 19 10:36:23    Final result by Geoffrey Dorman MD (19 10:36:23)                 Impression:      1. Findings consistent with interval spontaneous .  2. Thickened endometrium, cannot exclude retained products of conception.  This report was flagged in Epic as abnormal.    Electronically signed by resident: Ward  Zeinab  Date:    2019  Time:    09:46    Electronically signed by: Geoffrey Dorman MD  Date:    2019  Time:    10:36             Narrative:    EXAMINATION:  US OB LESS THAN 14 WEEKS FIRST GESTATION    CLINICAL HISTORY:  Threatened     TECHNIQUE:  Transabdominal sonography of the pelvis was performed, followed by transvaginal sonography to better evaluate the uterus and ovaries.    COMPARISON:  Ultrasound Ob less than 14 weeks with transvaginal 2019    FINDINGS:  Uterus measures 6.1 x 5.6 x 10.9 cm.  The previously identified gestational sac is no longer visualized.  Complex material (most likely hemorrhage) seen within the endometrial cavity, findings are consistent with a spontaneous .  Endometrium remains thickened and thus cannot definitively rule out retained fetal products.    Right ovary: Normal in size measuring 2.5 x 2.1 x 2.8 cm    Left ovary: Normal in size measuring 3.4 x 2.4 x 1.6 cm    There is intact vascular flow to the bilateral ovaries.    Miscellaneous: Trace free fluid in the cul-de-sac.                                 Medical Decision Making:   History:   Old Medical Records: I decided to obtain old medical records.  Clinical Tests:   Lab Tests: Ordered and Reviewed  ED Management:  4:01 PM  Case discussed with Dr Howard of the OBGYN service who will evaluate the pt shortly.     7:00 PM    Repeat hemoglobin now 7.6, from 8.1 but pt is very lightheaded with standing. Case discussed with OBGYN as pt may need admission for transfusion due to symptomatic anemia.     7:32 PM   Pt attempted to stand for orthostatics but had a near syncopal episode.                 Scribe Attestation:   Scribe #1: I performed the above scribed service and the documentation accurately describes the services I performed. I attest to the accuracy of the note.    Attending Attestation:           Physician Attestation for Scribe:  Physician Attestation Statement for Scribe #1: I, Dr. Soto,  reviewed documentation, as scribed by Fatuma Pulido in my presence, and it is both accurate and complete.           Patient presents referred from [facility] due to spontaneous miscarriage, heavy vaginal bleeding and decreasing hemoglobin.  Upon arrival she is hemodynamically stable.  Chart review shows hemoglobin dropped from approximately 10-8.1.  She was seen by our OB gyn service here, perform pelvic exam, removed further products during pelvic exam.  Repeat hemoglobin shows continued downward trend, now 7.6.  The patient however is markedly symptomatic when standing.  Suspect her true hemoglobin will be lower than the 7.6 once she equilibrates, given the recent heavy bleeding and that the patient will require admission for transfusion.  Case discussed with the gyn service again, courtesy admission orders to their service written.             Clinical Impression:     1. Miscarriage    2. Abdominal pain, unspecified abdominal location    3. Vaginal bleeding    4. Symptomatic anemia                                   Jermaine Soto II, MD  08/09/19 [id number]

## 2019-08-09 NOTE — ED NOTES
Adult Physical Assessment  LOC: Angelique Sparrow, 44 y.o. female verified via two identifiers.  The patient is awake, alert, oriented and speaking appropriately at this time.  APPEARANCE: Patient crying and in distress. Pt pacing around the room. Patient is clean and well groomed, patient's clothing is properly fastened.  SKIN:The skin is warm and dry, color consistent with ethnicity, patient has normal skin turgor and moist mucus membranes, skin intact, no breakdown or brusing noted.  MUSCULOSKELETAL: Patient moving all extremities well, no obvious swelling or deformities noted.  RESPIRATORY: Airway is open and patent, respirations are spontaneous, patient has a normal effort and rate, no accessory muscle use noted.  CARDIAC: Patient has a normal rate and rhythm, no periphreal edema noted in any extremity, capillary refill < 3 seconds in all extremities  ABDOMEN: Soft and to palpation in lower quadrant and pelvis, no abdominal distention noted. Bowel sounds present in all four quadrants.  NEUROLOGIC: Eyes open spontaneously, behavior appropriate to situation, follows commands, facial expression symmetrical, bilateral hand grasp equal and even, purposeful motor response noted, normal sensation in all extremities when touched with a finger.

## 2019-08-09 NOTE — ED TRIAGE NOTES
Pt transferred to [facility] from [facility] by EMS. Patient went to hospital on Wednesday and was told she was having miscarriage. Cramping and bleeding began today. Patient in pain 10/10. Heavy vaginal bleeding present.         Patient AAOx3 (Thai speaking), VSS, daughter at bedside/interprets for mother.

## 2019-08-09 NOTE — ED NOTES
Bed: Incoming ED Transfer 1  Expected date:   Expected time:   Means of arrival:   Comments:  Transfer from main vaginal bleeding

## 2019-08-09 NOTE — ED NOTES
Pt requests, and provided with, feminine pads per  who speaks English, states she still has some vaginal bleeding.

## 2019-08-09 NOTE — ED PROVIDER NOTES
Encounter Date: 2019       History     Chief Complaint   Patient presents with    Abdominal Pain     dx with miscarriage , still c/o abdominal pain and small amount of vaginal bleeding.      44 y.o [id number] at approximately 10 weeks presents to the ER with abdominal pain and vaginal bleeding. Patient was seen in the ER Wednesday for this problem. At that time ultrasound was done and the pregnancy looked to be non viable. She had an obygyn appointment scheduled for Thursday so she was discharge with plans for follow up with obgyn. At her appointment Thursday she was not having this bleeding and abdominal pain so they believed it was fine for her to follow up on Monday. This morning she woke up with what she describes as a lot of vaginal bleeding. She is also complaining of abdominal pain that goes down to her waist.         Review of patient's allergies indicates:  No Known Allergies  No past medical history on file.  No past surgical history on file.  No family history on file.  Social History     Tobacco Use    Smoking status: Not on file   Substance Use Topics    Alcohol use: Not on file    Drug use: Not on file     Review of Systems   Constitutional: Negative for chills and fever.   HENT: Negative for nosebleeds and sore throat.    Respiratory: Negative for cough and shortness of breath.    Cardiovascular: Negative for chest pain and palpitations.   Gastrointestinal: Positive for abdominal pain and nausea. Negative for vomiting.   Genitourinary: Positive for vaginal bleeding. Negative for dysuria.   Musculoskeletal: Negative for back pain.   Skin: Negative for rash and wound.   Neurological: Negative for dizziness and weakness.   Hematological: Does not bruise/bleed easily.   Psychiatric/Behavioral: Negative for agitation and confusion.       Physical Exam     Initial Vitals [19 0543]   BP Pulse Resp Temp SpO2   (!) 117/59 74 18 98.3 °F (36.8 °C) 100 %      MAP       --         Physical  Exam    Constitutional: She appears well-developed and well-nourished. She appears distressed.   HENT:   Head: Normocephalic and atraumatic.   Eyes: Conjunctivae and EOM are normal.   Neck: Normal range of motion. Neck supple.   Cardiovascular: Normal rate, regular rhythm and normal heart sounds. Exam reveals no gallop and no friction rub.    No murmur heard.  Pulmonary/Chest: Breath sounds normal. No respiratory distress.   Abdominal: Soft. Bowel sounds are normal. There is tenderness. There is no rebound and no guarding.   TTP throughout abdomen   Musculoskeletal: Normal range of motion.   Neurological: She is alert and oriented to person, place, and time.   Skin: Skin is warm and dry.   Psychiatric: She has a normal mood and affect.         ED Course   Procedures  Labs Reviewed   CBC W/ AUTO DIFFERENTIAL - Abnormal; Notable for the following components:       Result Value    Hemoglobin 9.8 (*)     Hematocrit 33.2 (*)     Mean Corpuscular Volume 72 (*)     Mean Corpuscular Hemoglobin 21.1 (*)     Mean Corpuscular Hemoglobin Conc 29.5 (*)     RDW 19.5 (*)     All other components within normal limits   BASIC METABOLIC PANEL - Abnormal; Notable for the following components:    CO2 20 (*)     All other components within normal limits   CBC W/ AUTO DIFFERENTIAL - Abnormal; Notable for the following components:    RBC 3.88 (*)     Hemoglobin 8.1 (*)     Hematocrit 29.2 (*)     Mean Corpuscular Volume 75 (*)     Mean Corpuscular Hemoglobin 20.9 (*)     Mean Corpuscular Hemoglobin Conc 27.7 (*)     RDW 19.3 (*)     All other components within normal limits   ISTAT PROCEDURE - Abnormal; Notable for the following components:    POC Creatinine 0.3 (*)     POC Potassium 3.0 (*)     POC TCO2 (MEASURED) 21 (*)     POC Hematocrit 27 (*)     All other components within normal limits   HCG, QUANTITATIVE, PREGNANCY   TYPE & SCREEN   ISTAT CHEM8          Imaging Results           US OB Less Than 14 Wks First Gestation (Final  result)  Result time 19 10:36:23    Final result by Geoffrey Dorman MD (19 10:36:23)                 Impression:      1. Findings consistent with interval spontaneous .  2. Thickened endometrium, cannot exclude retained products of conception.  This report was flagged in Epic as abnormal.    Electronically signed by resident: Ward Arriola  Date:    2019  Time:    09:46    Electronically signed by: Geoffrey Dorman MD  Date:    2019  Time:    10:36             Narrative:    EXAMINATION:  US OB LESS THAN 14 WEEKS FIRST GESTATION    CLINICAL HISTORY:  Threatened     TECHNIQUE:  Transabdominal sonography of the pelvis was performed, followed by transvaginal sonography to better evaluate the uterus and ovaries.    COMPARISON:  Ultrasound Ob less than 14 weeks with transvaginal 2019    FINDINGS:  Uterus measures 6.1 x 5.6 x 10.9 cm.  The previously identified gestational sac is no longer visualized.  Complex material (most likely hemorrhage) seen within the endometrial cavity, findings are consistent with a spontaneous .  Endometrium remains thickened and thus cannot definitively rule out retained fetal products.    Right ovary: Normal in size measuring 2.5 x 2.1 x 2.8 cm    Left ovary: Normal in size measuring 3.4 x 2.4 x 1.6 cm    There is intact vascular flow to the bilateral ovaries.    Miscellaneous: Trace free fluid in the cul-de-sac.                                 Medical Decision Making:   Initial Assessment:   44 y.o [id number] at approximately 10 weeks presents to the ER with abdominal pain and vaginal bleeding. She was seen Wednesday and determined to have a non-viable pregnancy. She followed up Thursday with her obgyn and they thought she could wait till Monday. She started bleeding more heavily and having more abdominal Friday morning.   Differential Diagnosis:   This is a miscarriage. Two different ultrasounds confirmed. She also had a decrease in beta HcG.  Patient is not likely septic at this time due to normal vital signs.   ED Management:  1. CBC, CMP, beta hcg  2. Vaginal Ultrasound  3. Pelvic exam performed by attending  4. Pain control with Nsaids and morphine    Patient's hgb on presentation is stable from Wednesday so she has not lost a lot of blood. Beta Hcg is down from about 10,000 to 7,500 which gives further evidence that the patient is miscarrying. Vaginal U/S reveals that there is no longer an intrauterine pregnancy. Attending performed a pelvic exam which revealed an open cervix with a lot of blood clots. No products of conception seen. A lot of bleeding was appreciated. OB at [facility] was consulted. They believe since the patient was hemodynamically stable and no change in HgB she did not be to be transferred. They recommended giving cytotec and to continue monitoring patient. 600 mcg cytotec was given and patient was monitored. A repeat CBC was drawn and that showed a hgb drop of about 1.5 points in 6 hours. We felt the patient was losing a lot of blood even though she stay hemodynamically stable. Felt that the best course of action was to transfer to [facility] for higher OB care.                       Clinical Impression:       ICD-10-CM ICD-9-CM   1. Miscarriage O03.9 634.90   2. Abdominal pain, unspecified abdominal location R10.9 789.00   3. Vaginal bleeding N93.9 623.8                                Jean Bermudez MD  Resident  08/09/19 0275

## 2019-08-10 VITALS
OXYGEN SATURATION: 100 % | BODY MASS INDEX: 21.51 KG/M2 | SYSTOLIC BLOOD PRESSURE: 99 MMHG | HEIGHT: 62 IN | TEMPERATURE: 97 F | WEIGHT: 116.88 LBS | RESPIRATION RATE: 16 BRPM | HEART RATE: 71 BPM | DIASTOLIC BLOOD PRESSURE: 56 MMHG

## 2019-08-10 LAB
BLD PROD TYP BPU: NORMAL
BLD PROD TYP BPU: NORMAL
BLOOD UNIT EXPIRATION DATE: NORMAL
BLOOD UNIT EXPIRATION DATE: NORMAL
BLOOD UNIT TYPE CODE: 6200
BLOOD UNIT TYPE CODE: 6200
BLOOD UNIT TYPE: NORMAL
BLOOD UNIT TYPE: NORMAL
CODING SYSTEM: NORMAL
CODING SYSTEM: NORMAL
DISPENSE STATUS: NORMAL
DISPENSE STATUS: NORMAL
TRANS ERYTHROCYTES VOL PATIENT: NORMAL ML
TRANS ERYTHROCYTES VOL PATIENT: NORMAL ML

## 2019-08-10 PROCEDURE — G0378 HOSPITAL OBSERVATION PER HR: HCPCS

## 2019-08-10 NOTE — DISCHARGE SUMMARY
[facility]  Obstetrics & Gynecology  Discharge Summary    Patient Name: Angelique Sparrow  MRN: [id number]  Admission Date: 2019  Hospital Length of Stay: 0 days  Discharge Date and Time:  08/10/2019 6:39 AM  Attending Physician: Yocasta Luong MD   Discharging Provider: Angel Douglass MD  Primary Care Provider: Primary Doctor No    HPI: Patient is a 44 y.o.  who presents as a transfer from the [facility] ED where she had vaginal bleeding during a miscarriage. She was diagnosed with a missed  on . She reported having increased bleeding this morning and presented to the ED. Repeat US showed IUP had passed, but question of further products in the uterus. She received one dose of PO cytotec and was observed in the ED where they became concerned about her bleeding.      Presently, patient complains of abdominal cramping and bleeding. She does have some dizziness. She denies fever, chills, nausea, vomiting.      History provided by daughter at bedside.     Hospital Course: Products of conception removed at bedside in ED. Patient remained overnight for blood transfusion due to symptomatic anemia. Much improved in the morning with no vaginal bleeding. Ready for discharge.     * No surgery found *     Consults:     Significant Diagnostic Studies: Labs:   CBC   Recent Labs   Lab 19  0639  19  1318 19  1817   WBC 5.72  --  5.60 6.11   HGB 9.8*  --  8.1* 7.6*   HCT 33.2*   < > 29.2* 25.6*     --  210 230    < > = values in this interval not displayed.       Pending Diagnostic Studies:     Procedure Component Value Units Date/Time    Specimen to Pathology - Surgery [[id number]] Collected:  19 2190    Order Status:  Sent Lab Status:  No result     Specimen:  Tissue         Final Active Diagnoses:    Diagnosis Date Noted POA    PRINCIPAL PROBLEM:  Miscarriage [O03.9] 2019 Yes      Problems Resolved During this Admission:        Discharged Condition:  good    Disposition: Home or Self Care    Follow Up:  Follow-up Information     Schedule an appointment as soon as possible for a visit with [facility].    Why:  Follow up               Patient Instructions:      hCG, quantitative   Standing Status: Future Standing Exp. Date: 08/08/20     Call MD for:  temperature >100.4     Call MD for:  persistent nausea and vomiting or diarrhea     Call MD for:  severe uncontrolled pain     Call MD for:  redness, tenderness, or signs of infection (pain, swelling, redness, odor or green/yellow discharge around incision site)     Call MD for:  difficulty breathing or increased cough     Call MD for:  severe persistent headache     Call MD for:  persistent dizziness, light-headedness, or visual disturbances     Call MD for:  increased confusion or weakness     Activity as tolerated     Medications:  Reconciled Home Medications:      Medication List      CONTINUE taking these medications    doxycycline 100 MG capsule  Commonly known as:  MONODOX  Take 1 capsule (100 mg total) by mouth every 12 (twelve) hours. MAY REFILL ONCE for 7 days     HYDROcodone-acetaminophen 5-325 mg per tablet  Commonly known as:  NORCO  Take 1 tablet by mouth every 6 (six) hours as needed for Pain.     ibuprofen 600 MG tablet  Commonly known as:  ADVIL,MOTRIN  Take 1 tablet (600 mg total) by mouth every 6 (six) hours as needed.            Angel Douglass MD  Obstetrics & Gynecology  [facility]

## 2019-08-10 NOTE — ED NOTES
Patient's daughter notified nurse patient vomited after Tylenol and water, MD notified, new orders noted.

## 2019-08-10 NOTE — NURSING
Gave verbal and written dc instructions to pt and , rx given by md for pain and abx, pt and  verbalized understanding, pt sitting on side of bed awaiting transport with no s/s of distress

## 2019-08-10 NOTE — ED NOTES
Pt had near syncopal episode per pct Jeffrey.  Pt became weak and appeared to start falling when trying to stand. She is placed back on stretcher. VSS with glucose being checked. Pt is easily aroused by verbal stimuli and remains AAOx4. Respirations are even and unlabored. MD notified and will reassess patient. No new orders given at this time.

## 2019-08-10 NOTE — PLAN OF CARE
Problem: Adult Inpatient Plan of Care  Goal: Plan of Care Review  Outcome: Ongoing (interventions implemented as appropriate)  VSS and Afebrile. AAOx4. One moderately soaked boogie pad changed upon arrival to unit. Pt educated to notify staff when/if she needs to change boogie pads again. Patient had very slight weakness upon ambulation to bathroom. Stand by assist upon ambulation. Pt tolerated well. Plan of care reviewed with patient. Purposeful Hourly rounding done. Call light at bedside. Bed at lowest position. Brakes on. Nonskid socks on. Will continue to monitor

## 2019-08-10 NOTE — ED NOTES
Report given to [facility] nurse Analy. All questions answered at time of hand off of care.

## 2019-08-10 NOTE — ED NOTES
While attempting to take the standing portion of orthostatic vitals pt's entire body went limp. Pt did not hit her head, pt did not hit the floor. Dr. Soto notified

## 2019-08-16 ENCOUNTER — LAB VISIT (OUTPATIENT)
Dept: LAB | Facility: OTHER | Age: 44
End: 2019-08-16
Payer: MEDICAID

## 2019-08-16 DIAGNOSIS — O03.9 MISCARRIAGE: ICD-10-CM

## 2019-08-16 LAB — HCG INTACT+B SERPL-ACNC: 146 MIU/ML

## 2019-08-16 PROCEDURE — 36415 COLL VENOUS BLD VENIPUNCTURE: CPT

## 2019-08-16 PROCEDURE — 84702 CHORIONIC GONADOTROPIN TEST: CPT

## 2019-08-19 ENCOUNTER — APPOINTMENT (OUTPATIENT)
Dept: LAB | Facility: HOSPITAL | Age: 44
End: 2019-08-19
Attending: OBSTETRICS & GYNECOLOGY
Payer: MEDICAID

## 2019-08-19 ENCOUNTER — OFFICE VISIT (OUTPATIENT)
Dept: OBSTETRICS AND GYNECOLOGY | Facility: CLINIC | Age: 44
End: 2019-08-19
Payer: MEDICAID

## 2019-08-19 VITALS
DIASTOLIC BLOOD PRESSURE: 60 MMHG | HEIGHT: 59 IN | WEIGHT: 132.5 LBS | BODY MASS INDEX: 26.71 KG/M2 | SYSTOLIC BLOOD PRESSURE: 92 MMHG

## 2019-08-19 DIAGNOSIS — Z01.419 WOMEN'S ANNUAL ROUTINE GYNECOLOGICAL EXAMINATION: ICD-10-CM

## 2019-08-19 DIAGNOSIS — F32.A DEPRESSION, UNSPECIFIED DEPRESSION TYPE: ICD-10-CM

## 2019-08-19 DIAGNOSIS — O02.1 MISSED ABORTION: Primary | ICD-10-CM

## 2019-08-19 LAB
BASOPHILS # BLD AUTO: 0.02 K/UL (ref 0–0.2)
BASOPHILS NFR BLD: 0.5 % (ref 0–1.9)
DIFFERENTIAL METHOD: ABNORMAL
EOSINOPHIL # BLD AUTO: 0.1 K/UL (ref 0–0.5)
EOSINOPHIL NFR BLD: 2.1 % (ref 0–8)
ERYTHROCYTE [DISTWIDTH] IN BLOOD BY AUTOMATED COUNT: 23.1 % (ref 11.5–14.5)
HCG INTACT+B SERPL-ACNC: 60 MIU/ML
HCT VFR BLD AUTO: 28.8 % (ref 37–48.5)
HGB BLD-MCNC: 8 G/DL (ref 12–16)
IMM GRANULOCYTES # BLD AUTO: 0.01 K/UL (ref 0–0.04)
IMM GRANULOCYTES NFR BLD AUTO: 0.3 % (ref 0–0.5)
LYMPHOCYTES # BLD AUTO: 1.3 K/UL (ref 1–4.8)
LYMPHOCYTES NFR BLD: 33.5 % (ref 18–48)
MCH RBC QN AUTO: 22.9 PG (ref 27–31)
MCHC RBC AUTO-ENTMCNC: 27.8 G/DL (ref 32–36)
MCV RBC AUTO: 83 FL (ref 82–98)
MONOCYTES # BLD AUTO: 0.4 K/UL (ref 0.3–1)
MONOCYTES NFR BLD: 10.4 % (ref 4–15)
NEUTROPHILS # BLD AUTO: 2 K/UL (ref 1.8–7.7)
NEUTROPHILS NFR BLD: 53.2 % (ref 38–73)
NRBC BLD-RTO: 0 /100 WBC
PLATELET # BLD AUTO: 304 K/UL (ref 150–350)
PMV BLD AUTO: 11.5 FL (ref 9.2–12.9)
RBC # BLD AUTO: 3.49 M/UL (ref 4–5.4)
WBC # BLD AUTO: 3.76 K/UL (ref 3.9–12.7)

## 2019-08-19 PROCEDURE — 99213 OFFICE O/P EST LOW 20 MIN: CPT | Mod: PBBFAC,TH,PO | Performed by: STUDENT IN AN ORGANIZED HEALTH CARE EDUCATION/TRAINING PROGRAM

## 2019-08-19 PROCEDURE — 99213 PR OFFICE/OUTPT VISIT, EST, LEVL III, 20-29 MIN: ICD-10-PCS | Mod: TH,S$PBB,, | Performed by: STUDENT IN AN ORGANIZED HEALTH CARE EDUCATION/TRAINING PROGRAM

## 2019-08-19 PROCEDURE — 99999 PR PBB SHADOW E&M-EST. PATIENT-LVL III: ICD-10-PCS | Mod: PBBFAC,,, | Performed by: STUDENT IN AN ORGANIZED HEALTH CARE EDUCATION/TRAINING PROGRAM

## 2019-08-19 PROCEDURE — 85025 COMPLETE CBC W/AUTO DIFF WBC: CPT

## 2019-08-19 PROCEDURE — 87624 HPV HI-RISK TYP POOLED RSLT: CPT

## 2019-08-19 PROCEDURE — 99999 PR PBB SHADOW E&M-EST. PATIENT-LVL III: CPT | Mod: PBBFAC,,, | Performed by: STUDENT IN AN ORGANIZED HEALTH CARE EDUCATION/TRAINING PROGRAM

## 2019-08-19 PROCEDURE — 99213 OFFICE O/P EST LOW 20 MIN: CPT | Mod: TH,S$PBB,, | Performed by: STUDENT IN AN ORGANIZED HEALTH CARE EDUCATION/TRAINING PROGRAM

## 2019-08-19 PROCEDURE — 88175 CYTOPATH C/V AUTO FLUID REDO: CPT

## 2019-08-19 PROCEDURE — 84702 CHORIONIC GONADOTROPIN TEST: CPT

## 2019-08-19 RX ORDER — SERTRALINE HYDROCHLORIDE 25 MG/1
25 TABLET, FILM COATED ORAL DAILY
Qty: 30 TABLET | Refills: 11 | Status: SHIPPED | OUTPATIENT
Start: 2019-08-19 | End: 2020-08-18

## 2019-08-19 RX ORDER — FERROUS SULFATE 325(65) MG
TABLET ORAL
Refills: 2 | COMMUNITY
Start: 2019-08-07

## 2019-08-19 RX ORDER — NORGESTIMATE AND ETHINYL ESTRADIOL 7DAYSX3 28
1 KIT ORAL DAILY
Qty: 28 TABLET | Refills: 11 | Status: SHIPPED | OUTPATIENT
Start: 2019-08-19 | End: 2020-08-18

## 2019-08-19 NOTE — PROGRESS NOTES
Chief Complaint   Patient presents with    Follow-up     spont AB       HPI:  44 y.o. female  presents as a gyn f/u for missed ab on 19. Pt was then seen in the ED and admitted on 19 for continued bleeding with some retained products that were removed in the ED, and 1 dose of PO cytotec was given. She received 1 u of pRBC during this stay as well due to ABLA. Beta hCG at time of admission to the hospital was 7526, and on  was found to be 146.     Today she states that she is still feeling dizzy and lightheaded. She also states that she has also had 2 episodes of shortness of breath. States that she is still spotting some, but her bleeding has slowed significantly. Also feeling nauseated. She also went walking yesterday for the first time since having the miscarriage and complains of some moderate leg swelling, but no redness or tenderness.     She also states that she has been feeling down and depressed lately. She notes episodes of crying out of the blue, and states that recently it has been hard for her to feel happy. Denies SI or HI, but would like something for her mood. She also states that she would like to see a counselor as well.     Contraception: she was using OCPs prior to this pregnancy, and would like to continue using them.   Pap: She denies any h/o abnormal paps, and states her last pap was   Mammogram: unsure when her last one was, but states she thinks it has been a while    History reviewed. No pertinent past medical history.  History reviewed. No pertinent surgical history.    Social History     Tobacco Use    Smoking status: Never Smoker    Smokeless tobacco: Never Used   Substance Use Topics    Alcohol use: Never     Frequency: Never     History reviewed. No pertinent family history.  OB History    Para Term  AB Living   7 5 5   2     SAB TAB Ectopic Multiple Live Births   1              # Outcome Date GA Lbr Partha/2nd Weight Sex Delivery Anes PTL Lv   7 AB         "    6 Term            5 Term            4 Term            3 Term            2 Term            1 SAB                MEDICATIONS: Reviewed with patient.  ALLERGIES: Patient has no known allergies.     ROS:  Review of Systems   Constitutional: Positive for fatigue. Negative for chills and fever.   Respiratory: Negative for cough. Shortness of breath: 2 transient episodes of SOB over the past week last just a few seconds. Not currently SOB.     Cardiovascular: Negative for chest pain and palpitations.   Gastrointestinal: Positive for abdominal pain (but improved since initial dx) and nausea. Negative for vomiting.   Genitourinary: Negative for vaginal discharge and vaginal odor. Vaginal bleeding: slowed to spotting over the last few days.   Neurological: Negative for headaches.        She reports lightheadedness, and feeling pre-syncopal often over this past week    Psychiatric/Behavioral: Positive for depression and sleep disturbance.       PHYSICAL EXAM:    BP 92/60   Ht 4' 11" (1.499 m)   Wt 60.1 kg (132 lb 7.9 oz)   BMI 26.76 kg/m²     Physical Exam:   Constitutional: She is oriented to person, place, and time. She appears well-developed and well-nourished.    HENT:   Head: Normocephalic and atraumatic.      Cardiovascular: Normal rate, regular rhythm and intact distal pulses.     Pulmonary/Chest: Effort normal and breath sounds normal. No respiratory distress.        Abdominal: Soft. Bowel sounds are normal. She exhibits no mass. There is no hepatosplenomegaly. There is tenderness (tender to palpation over pelvic area ). No hernia.     Genitourinary: Vagina normal. Uterus is tender (mild tenderness to palpation). Uterus is not enlarged. Cervix is normal. Right adnexum displays no tenderness and no fullness. Left adnexum displays no tenderness and no fullness. Vaginal discharge: minimal brown blood noted in the vaginal vault.   Genitourinary Comments: External genitalia: Normal  Bladder: Normal  Urethra: " Normal  Urethral meatus: Normal               Neurological: She is alert and oriented to person, place, and time.    Skin: Skin is warm and dry.    Psychiatric:   Seems sad, talking with head down         ASSESSMENT & PLAN:   Missed   -     CBC auto differential  -     Contraception- OCPs, will refill her prescription.   -     hCG, quantitative; Future; Expected date: 2019    Women's annual routine gynecological examination  -     Mammo Digital Screening Bilat; Future; Expected date: 2019  -     Liquid-based pap smear, screening  -     HPV High Risk Genotypes, PCR    Depression, unspecified depression type  -     sertraline (ZOLOFT) 25 MG tablet; Take 1 tablet (25 mg total) by mouth once daily.  Dispense: 30 tablet; Refill: 11        Return to clinic in 4 weeks to assess mood  Bleeding precautions given  Pt counseled to return to clinic if her depression worsens, or if any of her symptoms of anemia worsen.   Will call results of CBC and B-hcg  Counseled that leg swelling likely due to anemia and excessive walking yesterday. Counseled on rest after activity.     Ariana Cheek M.D.   OB/GYN  PGY-1    No questions,very light spotting, anemic on Fe, Labs today. Recommend 4 week follow up.  I have reviewed the resident's note, evaluated the patient and agree with the diagnosis and management plan

## 2019-08-23 ENCOUNTER — LAB VISIT (OUTPATIENT)
Dept: LAB | Facility: OTHER | Age: 44
End: 2019-08-23
Payer: MEDICAID

## 2019-08-23 DIAGNOSIS — O02.1 MISSED ABORTION: Primary | ICD-10-CM

## 2019-08-23 DIAGNOSIS — O02.1 MISSED ABORTION: ICD-10-CM

## 2019-08-23 LAB
HCG INTACT+B SERPL-ACNC: 24 MIU/ML
HPV HR 12 DNA CVX QL NAA+PROBE: NEGATIVE
HPV16 AG SPEC QL: NEGATIVE
HPV18 DNA SPEC QL NAA+PROBE: NEGATIVE

## 2019-08-23 PROCEDURE — 84702 CHORIONIC GONADOTROPIN TEST: CPT

## 2019-08-23 PROCEDURE — 36415 COLL VENOUS BLD VENIPUNCTURE: CPT

## 2019-08-27 ENCOUNTER — PATIENT MESSAGE (OUTPATIENT)
Dept: OBSTETRICS AND GYNECOLOGY | Facility: CLINIC | Age: 44
End: 2019-08-27

## 2019-08-30 ENCOUNTER — LAB VISIT (OUTPATIENT)
Dept: LAB | Facility: OTHER | Age: 44
End: 2019-08-30
Payer: MEDICAID

## 2019-08-30 DIAGNOSIS — O02.1 MISSED ABORTION: ICD-10-CM

## 2019-08-30 DIAGNOSIS — O02.1 MISSED ABORTION: Primary | ICD-10-CM

## 2019-08-30 LAB — HCG INTACT+B SERPL-ACNC: 7.9 MIU/ML

## 2019-08-30 PROCEDURE — 84702 CHORIONIC GONADOTROPIN TEST: CPT

## 2019-08-30 PROCEDURE — 36415 COLL VENOUS BLD VENIPUNCTURE: CPT

## 2019-09-04 ENCOUNTER — TELEPHONE (OUTPATIENT)
Dept: OBSTETRICS AND GYNECOLOGY | Facility: CLINIC | Age: 44
End: 2019-09-04

## 2022-01-06 ENCOUNTER — LAB VISIT (OUTPATIENT)
Dept: PRIMARY CARE CLINIC | Facility: CLINIC | Age: 47
End: 2022-01-06
Payer: OTHER GOVERNMENT

## 2022-01-06 DIAGNOSIS — Z20.822 CONTACT WITH AND (SUSPECTED) EXPOSURE TO COVID-19: ICD-10-CM

## 2022-01-06 LAB
CTP QC/QA: YES
SARS-COV-2 AG RESP QL IA.RAPID: NEGATIVE

## 2022-01-06 PROCEDURE — 87811 SARS-COV-2 COVID19 W/OPTIC: CPT

## 2023-12-18 NOTE — TELEPHONE ENCOUNTER
Called patient to report PAP results and lab results. No answer.   
PAST MEDICAL HISTORY:  DM (diabetes mellitus)     HLD (hyperlipidemia)     HTN (hypertension)